# Patient Record
Sex: FEMALE | Race: ASIAN | URBAN - METROPOLITAN AREA
[De-identification: names, ages, dates, MRNs, and addresses within clinical notes are randomized per-mention and may not be internally consistent; named-entity substitution may affect disease eponyms.]

---

## 2023-02-21 ENCOUNTER — OFFICE VISIT (OUTPATIENT)
Dept: URBAN - METROPOLITAN AREA CLINIC 85 | Facility: CLINIC | Age: 53
End: 2023-02-21
Payer: OTHER GOVERNMENT

## 2023-02-21 DIAGNOSIS — H40.013 OPEN ANGLE WITH BORDERLINE FINDINGS, LOW RISK, BILATERAL: ICD-10-CM

## 2023-02-21 DIAGNOSIS — H04.123 DRY EYE SYNDROME OF BILATERAL LACRIMAL GLANDS: Primary | ICD-10-CM

## 2023-02-21 PROCEDURE — 92133 CPTRZD OPH DX IMG PST SGM ON: CPT | Performed by: OPTOMETRIST

## 2023-02-21 PROCEDURE — 92004 COMPRE OPH EXAM NEW PT 1/>: CPT | Performed by: OPTOMETRIST

## 2023-02-21 ASSESSMENT — KERATOMETRY
OD: 43.00
OS: 42.88

## 2023-02-21 ASSESSMENT — VISUAL ACUITY
OS: 20/20
OD: 20/20

## 2023-02-21 ASSESSMENT — INTRAOCULAR PRESSURE
OS: 17
OD: 17

## 2023-02-21 NOTE — IMPRESSION/PLAN
Impression: Open angle with borderline findings, low risk, bilateral: H40.013. Plan: Discussed findings with patient. RTC 2 months for IOP check with possible 24-2 CVF, gonio, pach.

## 2023-03-16 ENCOUNTER — APPOINTMENT (RX ONLY)
Dept: URBAN - METROPOLITAN AREA CLINIC 68 | Facility: CLINIC | Age: 53
Setting detail: DERMATOLOGY
End: 2023-03-16

## 2023-03-16 DIAGNOSIS — L30.9 DERMATITIS, UNSPECIFIED: ICD-10-CM

## 2023-03-16 PROCEDURE — ? PRESCRIPTION

## 2023-03-16 PROCEDURE — 99202 OFFICE O/P NEW SF 15 MIN: CPT

## 2023-03-16 PROCEDURE — ? COUNSELING

## 2023-03-16 RX ORDER — CLOBETASOL PROPIONATE 0.5 MG/G
CREAM TOPICAL BID
Qty: 60 | Refills: 0 | Status: ERX

## 2023-03-16 RX ORDER — CLOBETASOL PROPIONATE 0.5 MG/G
CREAM TOPICAL BID
Qty: 60 | Refills: 0 | Status: ERX | COMMUNITY
Start: 2023-03-16

## 2023-03-16 RX ADMIN — CLOBETASOL PROPIONATE: 0.5 CREAM TOPICAL at 00:00

## 2023-03-16 ASSESSMENT — LOCATION DETAILED DESCRIPTION DERM
LOCATION DETAILED: LEFT BUTTOCK
LOCATION DETAILED: RIGHT BUTTOCK

## 2023-03-16 ASSESSMENT — LOCATION SIMPLE DESCRIPTION DERM
LOCATION SIMPLE: RIGHT BUTTOCK
LOCATION SIMPLE: LEFT BUTTOCK

## 2023-03-16 ASSESSMENT — LOCATION ZONE DERM: LOCATION ZONE: TRUNK

## 2023-03-16 NOTE — PROCEDURE: MIPS QUALITY
Quality 226: Preventive Care And Screening: Tobacco Use: Screening And Cessation Intervention: Patient screened for tobacco use, is a smoker AND did not receive Cessation Counseling during measurement period or in the six months prior to the measurement period
Detail Level: Detailed
Quality 431: Preventive Care And Screening: Unhealthy Alcohol Use - Screening: Patient not identified as an unhealthy alcohol user when screened for unhealthy alcohol use using a systematic screening method

## 2023-06-12 ENCOUNTER — OFFICE VISIT (OUTPATIENT)
Dept: URBAN - METROPOLITAN AREA CLINIC 85 | Facility: CLINIC | Age: 53
End: 2023-06-12
Payer: OTHER GOVERNMENT

## 2023-06-12 DIAGNOSIS — H04.123 DRY EYE SYNDROME OF BILATERAL LACRIMAL GLANDS: ICD-10-CM

## 2023-06-12 DIAGNOSIS — H40.013 OPEN ANGLE WITH BORDERLINE FINDINGS, LOW RISK, BILATERAL: Primary | ICD-10-CM

## 2023-06-12 PROCEDURE — 76514 ECHO EXAM OF EYE THICKNESS: CPT | Performed by: OPTOMETRIST

## 2023-06-12 PROCEDURE — 92012 INTRM OPH EXAM EST PATIENT: CPT | Performed by: OPTOMETRIST

## 2023-06-12 PROCEDURE — 92083 EXTENDED VISUAL FIELD XM: CPT | Performed by: OPTOMETRIST

## 2023-06-12 PROCEDURE — 92020 GONIOSCOPY: CPT | Performed by: OPTOMETRIST

## 2023-06-12 ASSESSMENT — INTRAOCULAR PRESSURE
OD: 16
OS: 17

## 2023-06-12 NOTE — IMPRESSION/PLAN
Impression: Open angle with borderline findings, low risk, bilateral: H40.013. Plan: Discussed findings with patient. Will continue to monitor. RTC in 3  months for IOP check with possible repeat 24-2 CVF. Consider treatment at next visit if repeatable defect on CVF. Get old records.

## 2024-02-26 ENCOUNTER — OFFICE VISIT (OUTPATIENT)
Dept: URBAN - METROPOLITAN AREA CLINIC 85 | Facility: CLINIC | Age: 54
End: 2024-02-26
Payer: OTHER GOVERNMENT

## 2024-02-26 DIAGNOSIS — H40.013 OPEN ANGLE WITH BORDERLINE FINDINGS, LOW RISK, BILATERAL: Primary | ICD-10-CM

## 2024-02-26 DIAGNOSIS — H04.123 DRY EYE SYNDROME OF BILATERAL LACRIMAL GLANDS: ICD-10-CM

## 2024-02-26 DIAGNOSIS — H57.10 OCULAR PAIN, UNSPECIFIED EYE: ICD-10-CM

## 2024-02-26 PROCEDURE — 92133 CPTRZD OPH DX IMG PST SGM ON: CPT | Performed by: OPTOMETRIST

## 2024-02-26 PROCEDURE — 99213 OFFICE O/P EST LOW 20 MIN: CPT | Performed by: OPTOMETRIST

## 2024-02-26 PROCEDURE — 92083 EXTENDED VISUAL FIELD XM: CPT | Performed by: OPTOMETRIST

## 2024-02-26 ASSESSMENT — VISUAL ACUITY
OS: 20/20
OD: 20/20

## 2024-02-26 ASSESSMENT — KERATOMETRY
OS: 43.00
OD: 42.88

## 2024-02-26 ASSESSMENT — INTRAOCULAR PRESSURE
OD: 16
OS: 16